# Patient Record
Sex: FEMALE | Race: WHITE
[De-identification: names, ages, dates, MRNs, and addresses within clinical notes are randomized per-mention and may not be internally consistent; named-entity substitution may affect disease eponyms.]

---

## 2020-11-27 ENCOUNTER — HOSPITAL ENCOUNTER (EMERGENCY)
Dept: HOSPITAL 26 - MED | Age: 35
Discharge: HOME | End: 2020-11-27
Payer: MEDICAID

## 2020-11-27 VITALS — SYSTOLIC BLOOD PRESSURE: 134 MMHG | DIASTOLIC BLOOD PRESSURE: 83 MMHG

## 2020-11-27 VITALS — WEIGHT: 236 LBS | BODY MASS INDEX: 44.56 KG/M2 | HEIGHT: 61 IN

## 2020-11-27 VITALS — DIASTOLIC BLOOD PRESSURE: 83 MMHG | SYSTOLIC BLOOD PRESSURE: 134 MMHG

## 2020-11-27 DIAGNOSIS — N93.8: Primary | ICD-10-CM

## 2020-11-27 DIAGNOSIS — D64.9: ICD-10-CM

## 2020-11-27 DIAGNOSIS — I10: ICD-10-CM

## 2020-11-27 DIAGNOSIS — E11.65: ICD-10-CM

## 2020-11-27 LAB
ANION GAP SERPL CALCULATED.3IONS-SCNC: 15.6 MMOL/L (ref 8–16)
BASOPHILS # BLD AUTO: 0.1 K/UL (ref 0–0.22)
BASOPHILS NFR BLD AUTO: 1.1 % (ref 0–2)
BUN SERPL-MCNC: 11 MG/DL (ref 7–18)
CHLORIDE SERPL-SCNC: 102 MMOL/L (ref 98–107)
CO2 SERPL-SCNC: 23.3 MMOL/L (ref 21–32)
CREAT SERPL-MCNC: 0.7 MG/DL (ref 0.6–1.3)
EOSINOPHIL # BLD AUTO: 0.3 K/UL (ref 0–0.4)
EOSINOPHIL NFR BLD AUTO: 3.2 % (ref 0–4)
ERYTHROCYTE [DISTWIDTH] IN BLOOD BY AUTOMATED COUNT: 13 % (ref 11.6–13.7)
GFR SERPL CREATININE-BSD FRML MDRD: 124 ML/MIN (ref 90–?)
GLUCOSE SERPL-MCNC: 201 MG/DL (ref 74–106)
HCT VFR BLD AUTO: 31.9 % (ref 36–48)
HGB BLD-MCNC: 11 G/DL (ref 12–16)
LYMPHOCYTES # BLD AUTO: 2.8 K/UL (ref 2.5–16.5)
LYMPHOCYTES NFR BLD AUTO: 30.9 % (ref 20.5–51.1)
MCH RBC QN AUTO: 32 PG (ref 27–31)
MCHC RBC AUTO-ENTMCNC: 35 G/DL (ref 33–37)
MCV RBC AUTO: 91.6 FL (ref 80–94)
MONOCYTES # BLD AUTO: 0.6 K/UL (ref 0.8–1)
MONOCYTES NFR BLD AUTO: 6.2 % (ref 1.7–9.3)
NEUTROPHILS # BLD AUTO: 5.4 K/UL (ref 1.8–7.7)
NEUTROPHILS NFR BLD AUTO: 58.6 % (ref 42.2–75.2)
PLATELET # BLD AUTO: 251 K/UL (ref 140–450)
POTASSIUM SERPL-SCNC: 3.9 MMOL/L (ref 3.5–5.1)
PROTHROMBIN TIME: 9.8 SECS (ref 10.8–13.4)
RBC # BLD AUTO: 3.48 MIL/UL (ref 4.2–5.4)
SODIUM SERPL-SCNC: 137 MMOL/L (ref 136–145)
WBC # BLD AUTO: 9.2 K/UL (ref 4.8–10.8)

## 2020-11-27 PROCEDURE — 81002 URINALYSIS NONAUTO W/O SCOPE: CPT

## 2020-11-27 PROCEDURE — 36415 COLL VENOUS BLD VENIPUNCTURE: CPT

## 2020-11-27 PROCEDURE — 80048 BASIC METABOLIC PNL TOTAL CA: CPT

## 2020-11-27 PROCEDURE — 81025 URINE PREGNANCY TEST: CPT

## 2020-11-27 PROCEDURE — 76830 TRANSVAGINAL US NON-OB: CPT

## 2020-11-27 PROCEDURE — 85610 PROTHROMBIN TIME: CPT

## 2020-11-27 PROCEDURE — 85025 COMPLETE CBC W/AUTO DIFF WBC: CPT

## 2020-11-27 PROCEDURE — 96372 THER/PROPH/DIAG INJ SC/IM: CPT

## 2020-11-27 PROCEDURE — 99284 EMERGENCY DEPT VISIT MOD MDM: CPT

## 2020-11-27 NOTE — NUR
36YO F C/O CONTINUOUS VAGINAL BLEEDING WITH CLOTS X 3 WEEKS. DENIES ABDOMINAL 
AND BACK PAIN. DENIES DYSURIA. DENIES ANY INJURY OR TRAUMA. IN ER, VSS. 0 PAIN 
AT THIS TIME. ABDOMEN SOFT, NONTENDER. PT RESTING COMFORTABLY IN BED WITH 1 
SIDERAIL UP. MARYD SAW PT AT BEDSIDE.





PMH: DM, HTN, HYPERCHOLESTEROL

UNCOMPLIANT WITH ALL MEDS

NKA

## 2021-10-27 ENCOUNTER — HOSPITAL ENCOUNTER (EMERGENCY)
Dept: HOSPITAL 26 - MED | Age: 36
Discharge: HOME | End: 2021-10-27
Payer: MEDICAID

## 2021-10-27 VITALS — HEIGHT: 60 IN | WEIGHT: 216 LBS | BODY MASS INDEX: 42.41 KG/M2

## 2021-10-27 VITALS — SYSTOLIC BLOOD PRESSURE: 155 MMHG | DIASTOLIC BLOOD PRESSURE: 104 MMHG

## 2021-10-27 DIAGNOSIS — E11.9: ICD-10-CM

## 2021-10-27 DIAGNOSIS — I10: ICD-10-CM

## 2021-10-27 DIAGNOSIS — Z79.899: ICD-10-CM

## 2021-10-27 DIAGNOSIS — H60.92: Primary | ICD-10-CM

## 2021-10-27 PROCEDURE — 99283 EMERGENCY DEPT VISIT LOW MDM: CPT

## 2021-10-27 PROCEDURE — 96372 THER/PROPH/DIAG INJ SC/IM: CPT

## 2021-10-27 RX ADMIN — KETOROLAC TROMETHAMINE ONE MG: 30 INJECTION, SOLUTION INTRAMUSCULAR; INTRAVENOUS at 15:27

## 2021-10-27 NOTE — NUR
Patient discharged with v/s stable. Written and verbal after care instructions 
given and explained. 

Patient alert, oriented and verbalized understanding of instructions. 
Ambulatory with steady gait. All questions addressed prior to discharge. ID 
band removed. Patient advised to follow up with PMD. Rx of Cipro, Naprosyn, 
Ofloxacin given. Patient educated on indication of medication including 
possible reaction and side effects. Opportunity to ask questions provided and 
answered.

## 2021-10-27 NOTE — NUR
34 y/o F BIB spouse c/o L ear pain and associated hearing loss x 3 days. 
Patient A&Ox4, ambulatory who states 8/10, sharp/constant, non-radiating pain 
to Left ear. Patient reports hearing loss is normal for her. Denies nausea, 
vomiting, diarrhea, chest pain, SOB, fever, chills, blurry vision. Pt reports 
taking prescribed amoxicillin. Bed locked in lowest position, side rails x 1, 
call light in reach. 



PMH: memorrhagia, HTN, HLD, DM2

NKA

Meds: Amoxicillin (2 days ago).